# Patient Record
Sex: MALE | Race: WHITE | NOT HISPANIC OR LATINO | Employment: FULL TIME | URBAN - NONMETROPOLITAN AREA
[De-identification: names, ages, dates, MRNs, and addresses within clinical notes are randomized per-mention and may not be internally consistent; named-entity substitution may affect disease eponyms.]

---

## 2018-05-05 ENCOUNTER — OFFICE VISIT CONVERTED (OUTPATIENT)
Dept: FAMILY MEDICINE CLINIC | Age: 28
End: 2018-05-05
Attending: FAMILY MEDICINE

## 2018-12-08 ENCOUNTER — OFFICE VISIT CONVERTED (OUTPATIENT)
Dept: FAMILY MEDICINE CLINIC | Age: 28
End: 2018-12-08
Attending: FAMILY MEDICINE

## 2019-07-13 ENCOUNTER — HOSPITAL ENCOUNTER (OUTPATIENT)
Dept: OTHER | Facility: HOSPITAL | Age: 29
Discharge: HOME | End: 2019-07-13
Attending: FAMILY MEDICINE

## 2019-07-13 ENCOUNTER — OFFICE VISIT CONVERTED (OUTPATIENT)
Dept: FAMILY MEDICINE CLINIC | Age: 29
End: 2019-07-13
Attending: FAMILY MEDICINE

## 2019-07-13 LAB
ALBUMIN SERPL-MCNC: 4.5 G/DL (ref 3.5–5)
ALBUMIN/GLOB SERPL: 1.7 {RATIO} (ref 1.4–2.6)
ALP SERPL-CCNC: 95 U/L (ref 53–128)
ALT SERPL-CCNC: 159 U/L (ref 10–40)
ANION GAP SERPL CALC-SCNC: 18 MMOL/L (ref 8–19)
AST SERPL-CCNC: 61 U/L (ref 15–50)
BASOPHILS # BLD MANUAL: 0.03 10*3/UL (ref 0–0.2)
BASOPHILS NFR BLD MANUAL: 0.6 % (ref 0–3)
BILIRUB SERPL-MCNC: 0.51 MG/DL (ref 0.2–1.3)
BUN SERPL-MCNC: 11 MG/DL (ref 5–25)
BUN/CREAT SERPL: 15 {RATIO} (ref 6–20)
CALCIUM SERPL-MCNC: 9.4 MG/DL (ref 8.7–10.4)
CHLORIDE SERPL-SCNC: 106 MMOL/L (ref 99–111)
CHOLEST SERPL-MCNC: 160 MG/DL (ref 107–200)
CHOLEST/HDLC SERPL: 5.2 {RATIO} (ref 3–6)
CONV CO2: 23 MMOL/L (ref 22–32)
CONV TOTAL PROTEIN: 7.1 G/DL (ref 6.3–8.2)
CREAT UR-MCNC: 0.71 MG/DL (ref 0.7–1.2)
DEPRECATED RDW RBC AUTO: 39.6 FL
EOSINOPHIL # BLD MANUAL: 0.11 10*3/UL (ref 0–0.7)
EOSINOPHIL NFR BLD MANUAL: 2.2 % (ref 0–7)
ERYTHROCYTE [DISTWIDTH] IN BLOOD BY AUTOMATED COUNT: 13.5 % (ref 11.5–14.5)
GFR SERPLBLD BASED ON 1.73 SQ M-ARVRAT: >60 ML/MIN/{1.73_M2}
GLOBULIN UR ELPH-MCNC: 2.6 G/DL (ref 2–3.5)
GLUCOSE SERPL-MCNC: 137 MG/DL (ref 70–99)
GRANS (ABSOLUTE): 2.79 10*3/UL (ref 2–8)
GRANS: 55.4 % (ref 30–85)
HBA1C MFR BLD: 14.1 G/DL (ref 14–18)
HCT VFR BLD AUTO: 39.1 % (ref 42–52)
HDLC SERPL-MCNC: 31 MG/DL (ref 40–60)
IMM GRANULOCYTES # BLD: 0.03 10*3/UL (ref 0–0.54)
IMM GRANULOCYTES NFR BLD: 0.6 % (ref 0–0.43)
LDLC SERPL CALC-MCNC: 109 MG/DL (ref 70–100)
LYMPHOCYTES # BLD MANUAL: 1.64 10*3/UL (ref 1–5)
LYMPHOCYTES NFR BLD MANUAL: 8.7 % (ref 3–10)
MCH RBC QN AUTO: 29.1 PG (ref 27–31)
MCHC RBC AUTO-ENTMCNC: 36.1 G/DL (ref 33–37)
MCV RBC AUTO: 80.8 FL (ref 80–96)
MONOCYTES # BLD AUTO: 0.44 10*3/UL (ref 0.2–1.2)
OSMOLALITY SERPL CALC.SUM OF ELEC: 298 MOSM/KG (ref 273–304)
PLATELET # BLD AUTO: 157 10*3/UL (ref 130–400)
PMV BLD AUTO: 11.1 FL (ref 7.4–10.4)
POTASSIUM SERPL-SCNC: 4.3 MMOL/L (ref 3.5–5.3)
RBC # BLD AUTO: 4.84 10*6/UL (ref 4.7–6.1)
SODIUM SERPL-SCNC: 143 MMOL/L (ref 135–147)
TRIGL SERPL-MCNC: 98 MG/DL (ref 40–150)
TSH SERPL-ACNC: 2.51 M[IU]/L (ref 0.27–4.2)
VARIANT LYMPHS NFR BLD MANUAL: 32.5 % (ref 20–45)
VLDLC SERPL-MCNC: 20 MG/DL (ref 5–37)
WBC # BLD AUTO: 5.04 10*3/UL (ref 4.8–10.8)

## 2019-12-23 ENCOUNTER — HOSPITAL ENCOUNTER (OUTPATIENT)
Dept: URGENT CARE | Facility: CLINIC | Age: 29
Discharge: HOME | End: 2019-12-23
Attending: FAMILY MEDICINE

## 2020-02-24 ENCOUNTER — HOSPITAL ENCOUNTER (OUTPATIENT)
Dept: URGENT CARE | Facility: CLINIC | Age: 30
Discharge: HOME | End: 2020-02-24

## 2020-04-11 ENCOUNTER — HOSPITAL ENCOUNTER (OUTPATIENT)
Dept: OTHER | Facility: HOSPITAL | Age: 30
Discharge: HOME | End: 2020-04-11

## 2020-04-11 LAB
ALBUMIN SERPL-MCNC: 4.3 G/DL (ref 3.5–5)
ALBUMIN/GLOB SERPL: 1.9 {RATIO} (ref 1.4–2.6)
ALP SERPL-CCNC: 92 U/L (ref 53–128)
ALT SERPL-CCNC: 51 U/L (ref 10–40)
ANION GAP SERPL CALC-SCNC: 14 MMOL/L (ref 8–19)
AST SERPL-CCNC: 25 U/L (ref 15–50)
BASOPHILS # BLD MANUAL: 0.04 10*3/UL (ref 0–0.2)
BASOPHILS NFR BLD MANUAL: 0.8 % (ref 0–3)
BILIRUB SERPL-MCNC: 0.44 MG/DL (ref 0.2–1.3)
BUN SERPL-MCNC: 15 MG/DL (ref 5–25)
BUN/CREAT SERPL: 22 {RATIO} (ref 6–20)
CALCIUM SERPL-MCNC: 9.1 MG/DL (ref 8.7–10.4)
CHLORIDE SERPL-SCNC: 109 MMOL/L (ref 99–111)
CONV CO2: 21 MMOL/L (ref 22–32)
CONV TOTAL PROTEIN: 6.6 G/DL (ref 6.3–8.2)
CREAT UR-MCNC: 0.69 MG/DL (ref 0.7–1.2)
DEPRECATED RDW RBC AUTO: 41.2 FL
EOSINOPHIL # BLD MANUAL: 0.1 10*3/UL (ref 0–0.7)
EOSINOPHIL NFR BLD MANUAL: 2.1 % (ref 0–7)
ERYTHROCYTE [DISTWIDTH] IN BLOOD BY AUTOMATED COUNT: 13.5 % (ref 11.5–14.5)
GFR SERPLBLD BASED ON 1.73 SQ M-ARVRAT: >60 ML/MIN/{1.73_M2}
GLOBULIN UR ELPH-MCNC: 2.3 G/DL (ref 2–3.5)
GLUCOSE SERPL-MCNC: 104 MG/DL (ref 70–99)
GRANS (ABSOLUTE): 2.73 10*3/UL (ref 2–8)
GRANS: 56.4 % (ref 30–85)
HBA1C MFR BLD: 14.5 G/DL (ref 14–18)
HCT VFR BLD AUTO: 41.8 % (ref 42–52)
IMM GRANULOCYTES # BLD: 0.01 10*3/UL (ref 0–0.54)
IMM GRANULOCYTES NFR BLD: 0.2 % (ref 0–0.43)
INR PPP: 1.07 (ref 2–3)
LYMPHOCYTES # BLD MANUAL: 1.56 10*3/UL (ref 1–5)
LYMPHOCYTES NFR BLD MANUAL: 8.3 % (ref 3–10)
MCH RBC QN AUTO: 28.7 PG (ref 27–31)
MCHC RBC AUTO-ENTMCNC: 34.7 G/DL (ref 33–37)
MCV RBC AUTO: 82.8 FL (ref 80–96)
MONOCYTES # BLD AUTO: 0.4 10*3/UL (ref 0.2–1.2)
OSMOLALITY SERPL CALC.SUM OF ELEC: 291 MOSM/KG (ref 273–304)
PLATELET # BLD AUTO: 164 10*3/UL (ref 130–400)
PMV BLD AUTO: 11 FL (ref 7.4–10.4)
POTASSIUM SERPL-SCNC: 4.4 MMOL/L (ref 3.5–5.3)
PROTHROMBIN TIME: 11.4 S (ref 9.4–12)
RBC # BLD AUTO: 5.05 10*6/UL (ref 4.7–6.1)
SODIUM SERPL-SCNC: 140 MMOL/L (ref 135–147)
VARIANT LYMPHS NFR BLD MANUAL: 32.2 % (ref 20–45)
WBC # BLD AUTO: 4.84 10*3/UL (ref 4.8–10.8)

## 2020-04-13 ENCOUNTER — OFFICE VISIT CONVERTED (OUTPATIENT)
Dept: FAMILY MEDICINE CLINIC | Age: 30
End: 2020-04-13
Attending: FAMILY MEDICINE

## 2021-05-18 NOTE — PROGRESS NOTES
Barney Richmond 1990     Office/Outpatient Visit    Visit Date: Sat, May 5, 2018 08:25 am    Provider: Pipo Breman MD (Assistant: Ursula Medina RN)    Location: [facility]        Electronically signed by Pipo Berman MD on  05/05/2018 12:14:15 PM                             SUBJECTIVE:        CC:     Mr. Richmond is a 27 year old White male.  6 month check up         HPI:         Patient presents with hTN.  His current cardiac medication regimen includes an ACE inhibitor.  He has not kept a blood pressure diary, but states that pressures have been well controlled.  Compliance with treatment has been good.          Dx with high cholesterol; current treatment includes diet.  Compliance with treatment has been fair.  Most recent lab tests include total cholesterol level ( 168 mg/dl ).      ROS:     CONSTITUTIONAL:  Negative for chills and fever.      E/N/T:  Negative for ear pain, nasal congestion and sore throat.      CARDIOVASCULAR:  Negative for chest pain, palpitations and pedal edema.      RESPIRATORY:  Positive for SNORES, POS SLEEP STUDY, DECLINES CPAP AT THIS TIME..   Negative for recent cough or dyspnea.      GASTROINTESTINAL:  Negative for abdominal pain, nausea and vomiting.      NEUROLOGICAL:  Negative for headaches.          [facility]/SH:     Last Reviewed on 5/05/2018 08:36 AM by Pipo Berman    Past Medical History:             PAST MEDICAL HISTORY             CURRENT MEDICAL PROVIDERS:    Gastroenterologist         Surgical History:         Positive for    Pressure Equalization Tubes;     Positive for    Fracture(s):    humerus fracture: dx'd in LEFT; ; ;         Family History:         Positive for Coronary Artery Disease and Hypertension.      Positive for Colon Cancer.      Positive for Seizure Disorder.      Positive for Type 1 Diabetes.      Mother: Hypertension         Social History:     Occupation: OFFICE WORK;     Marital Status:           Tobacco/Alcohol/Supplements:     Last Reviewed on 5/05/2018 08:35 AM by Pipo Berman    Tobacco: He has never smoked.  Non-drinker     Caffeine:  He admits to consuming caffeine via -LITTLE.          Substance Abuse History:     Last Reviewed on 5/05/2018 08:35 AM by Pipo Berman    NEGATIVE             Current Problems:     Last Reviewed on 5/05/2018 08:36 AM by Pipo Berman    High cholesterol     Obstructive sleep apnea (declines CPAP)     HTN     Nonalcoholic fatty liver         Immunizations:     Influenza Virus Vaccine pf (adult) 10/5/2016     Influenza Virus Vaccine pf (adult) 10/9/2017         Allergies:     Last Reviewed on 5/05/2018 08:35 AM by Pipo Berman      No Known Drug Allergies.         Current Medications:     Last Reviewed on 5/05/2018 08:36 AM by Pipo Berman    Lisinopril 10mg Tablet 1 in am     Lipitor 10mg Tablet 1 tab daily     double blind clinical trial Obeticholic acid (OCA) 1 x day     Fish Oil 1,200mg Softgel capsule Take 1 capsule(s) by mouth daily     Vitamin E 400IU Capsules Take 2 capsule(s) by mouth daily         OBJECTIVE:        Vitals:         Current: 5/5/2018 8:29:35 AM    Ht:  5 ft, 9 in;  Wt: 290 lbs;  BMI: 42.8    T: 98.6 F (oral);  BP: 117/71 mm Hg (left arm, sitting);  P: 56 bpm (left arm (BP Cuff), sitting);  sCr: 0.88 mg/dL;  GFR: 148.09        Exams:     PHYSICAL EXAM:     GENERAL: Vitals recorded well developed, well nourished;  well groomed;  no apparent distress;     NECK: trachea is midline; thyroid is non-palpable;     RESPIRATORY: normal respiratory rate and pattern with no distress; normal breath sounds with no rales, rhonchi, wheezes or rubs;     CARDIOVASCULAR: normal rate; rhythm is regular;  no systolic murmur; no edema;     LYMPHATIC: no enlargement of cervical or facial nodes; no inguinal adenopathy;     NEUROLOGIC: GROSSLY INTACT     PSYCHIATRIC:  appropriate affect and demeanor; normal speech  pattern; grossly normal memory;         ASSESSMENT           401.1   I10  HTN              DDx:     272.0   E78.00  High cholesterol              DDx:         ORDERS:         Meds Prescribed:       Refill of: Lipitor (Atorvastatin Calcium) 10mg Tablet 1 tab daily  #90 (Ninety) tablet(s) Refills: 1       Refill of: Lisinopril 10mg Tablet 1 in am  #90 (Ninety) tablet(s) Refills: 1                 PLAN:          HTN         MEDICATIONS: (no change to current medication regimen)     RECOMMENDATIONS given include: perform routine monitoring of blood pressure with home blood pressure cuff.      FOLLOW-UP: Schedule a follow-up visit in 6 months.            Prescriptions:       Refill of: Lisinopril 10mg Tablet 1 in am  #90 (Ninety) tablet(s) Refills: 1          High cholesterol           Prescriptions:       Refill of: Lipitor (Atorvastatin Calcium) 10mg Tablet 1 tab daily  #90 (Ninety) tablet(s) Refills: 1             Patient Recommendations:        For  HTN:     Begin monitoring your blood pressure by brief nurse visits at our office, a home blood pressure monitor, or by checking on the machines in pharmacies or stores.  Keep a log of the readings.  Schedule a follow-up visit in 6 months.              CHARGE CAPTURE           **Please note: ICD descriptions below are intended for billing purposes only and may not represent clinical diagnoses**        Primary Diagnosis:         401.1 HTN            I10    Essential (primary) hypertension              Orders:          71152   Office/outpatient visit; established patient, level 4  (In-House)           272.0 High cholesterol            E78.00    Pure hypercholesterolemia, unspecified

## 2021-05-18 NOTE — PROGRESS NOTES
Barney Richmond  1990     Office/Outpatient Visit    Visit Date: Mon, Apr 13, 2020 09:49 am    Provider: Patel Berman MD (Assistant: Marva Mcginnis MA)    Location: [facility]        Electronically signed by Patel Berman MD on  04/13/2020 10:36:56 AM                             Subjective:        CC: Mr. Richmond is a 29 year old White male.  This is a follow-up visit.  med refills PRESENT:  PATEL KEVIN MD         HPI:           Complaint of nonalcoholic fatty liver..  The symptom began years ago.  The severity is of moderate intensity.  FOLLOWED BY GASTRO, TAKEING AN INVESTIGATIONAL MED           Concerning essential (primary) hypertension, this was first diagnosed several years ago.  His current cardiac medication regimen includes an ACE inhibitor.  Compliance with treatment has been good.  Mr. Richmond does not check his blood pressure other than at his clinic appointments.            In regard to the pure hypercholesterolemia, unspecified, current treatment includes a lipid lowering agent.  Compliance with treatment has been good.  Most recent lab tests include Total Cholesterol:  160 (mg/dL) (07/13/2019), HDL:  31 (mg/dL) (07/13/2019), Triglycerides:  98 (mg/dL) (07/13/2019), LDL:  109 (mg/dL) (07/13/2019).  HE IS ALSO ON A RESEARCH MED FOR HIS NELSON     ROS:     CONSTITUTIONAL:  Negative for chills and fever.      E/N/T:  Negative for ear pain, nasal congestion and sore throat.      CARDIOVASCULAR:  Negative for chest pain, palpitations and pedal edema.      RESPIRATORY:  Positive for POS SLEEP STUDY, TOLERATING CPAP WELL AND FEELING BETTER.   Negative for recent cough or dyspnea.      GASTROINTESTINAL:  Negative for abdominal pain, nausea and vomiting.      MUSCULOSKELETAL:  Negative for myalgias.      NEUROLOGICAL:  Negative for headaches.          Past Medical History / Family History / Social History:         Last Reviewed on 4/13/2020 10:09  AM by Pipo Berman    Past Medical History:             PAST MEDICAL HISTORY             CURRENT MEDICAL PROVIDERS:    Gastroenterologist         Surgical History:         Positive for    Pressure Equalization Tubes;     Positive for    Fracture(s):    humerus fracture: internal fixation, LEFT-PLATED;;; ;         Family History:         Positive for Coronary Artery Disease and Hypertension.      Positive for Colon Cancer.      Positive for Cerebrovascular Accident and Seizure Disorder.      Positive for Type 1 Diabetes.      Mother: Hypertension         Social History:     Occupation: OFFICE WORK;     Marital Status:          Tobacco/Alcohol/Supplements:     Last Reviewed on 4/13/2020 10:09 AM by Pipo Berman    Tobacco: He has never smoked.  Non-drinker     Caffeine:  He admits to consuming caffeine via -LITTLE.          Substance Abuse History:     Last Reviewed on 4/13/2020 10:09 AM by Pipo Berman    NEGATIVE         Current Problems:     Last Reviewed on 4/13/2020 10:10 AM by Pipo Berman    Fatty (change of) liver, not elsewhere classified    Nonalcoholic fatty liver    Essential (primary) hypertension    Obstructive sleep apnea (CPAP)    Pure hypercholesterolemia, unspecified        Immunizations:     Fluzone Quadrivalent (3+ years) 12/8/2018    Influenza Virus Vaccine pf (adult) 10/5/2016    Influenza Virus Vaccine pf (adult) 10/9/2017        Allergies:     Last Reviewed on 4/13/2020 10:10 AM by Pipo Berman    No Known Allergies.        Current Medications:     Last Reviewed on 4/13/2020 10:10 AM by Pipo Berman    lisinopriL 10 mg oral tablet [TAKE 1 TABLET BY MOUTH IN THE MORNING]    Fish Oil 360-1,200 mg oral capsule [Take 1 capsule(s) by mouth daily]    Vitamin E 400 unit oral capsule [Take 2 capsule(s) by mouth daily]    double blind clinical trial Obeticholic acid (OCA) 1 x day     atorvastatin 10 mg oral tablet [TAKE 1 TABLET BY  MOUTH EVERY DAY]        Assessment:         K76.0   Fatty (change of) liver, not elsewhere classified       I10   Essential (primary) hypertension       E78.00   Pure hypercholesterolemia, unspecified       G47.33   Obstructive sleep apnea (CPAP)           ORDERS:         Meds Prescribed:       [Refilled] lisinopriL 10 mg oral tablet [TAKE 1 TABLET BY MOUTH IN THE MORNING], #90 (ninety) tablets, Refills: 1 (one)       [Refilled] atorvastatin 10 mg oral tablet [TAKE 1 TABLET BY MOUTH EVERY DAY], #90 (ninety) tablets, Refills: 1 (one)         Lab Orders:       APPTO  Appointment need  (In-House)                      Plan:         Fatty (change of) liver, not elsewhere classified    Telehealth: Verbal consent obtained for visit to occur via phone call; Total time spent was 6 minutes; 99221--[place]ho[place] E/M 5-10 minutes     FOLLOW-UP: Schedule a follow-up visit in 6 months.:.  PLANS PER SPECIALIST           Orders:       APPTO  Appointment need  (In-House)              Essential (primary) hypertension          Prescriptions:       [Refilled] lisinopriL 10 mg oral tablet [TAKE 1 TABLET BY MOUTH IN THE MORNING], #90 (ninety) tablets, Refills: 1 (one)         Pure hypercholesterolemia, unspecified          Prescriptions:       [Refilled] atorvastatin 10 mg oral tablet [TAKE 1 TABLET BY MOUTH EVERY DAY], #90 (ninety) tablets, Refills: 1 (one)         Obstructive sleep apnea (CPAP)    Observe as improved             Patient Recommendations:        For  Fatty (change of) liver, not elsewhere classified:    Schedule a follow-up visit in 6 months.                APPOINTMENT INFORMATION:        Monday Tuesday Wednesday Thursday Friday Saturday Sunday            Time:___________________AM  PM   Date:_____________________             Charge Capture:         Primary Diagnosis:     K76.0  Fatty (change of) liver, not elsewhere classified           Orders:      29691  Phys/QHP telephone evaluation 5-10 min  (In-House)             APPTO  Appointment need  (In-House)              I10  Essential (primary) hypertension     E78.00  Pure hypercholesterolemia, unspecified     G47.33  Obstructive sleep apnea (CPAP)

## 2021-05-18 NOTE — PROGRESS NOTES
Barney Richmond 1990     Office/Outpatient Visit    Visit Date: Sat, Jul 13, 2019 08:28 am    Provider: Pipo Berman MD (Assistant: Ursula Medina RN)    Location: [facility]        Electronically signed by Pipo Berman MD on  07/13/2019 12:29:41 PM                             SUBJECTIVE:        CC:     Mr. Richmond is a 28 year old White male.  6 month follow up         HPI:         Mr. Richmond presents with screening for depression.          With regard to the hTN, his current cardiac medication regimen includes an ACE inhibitor.  He has not kept a blood pressure diary, but states that pressures have been well controlled.  Compliance with treatment has been good.          High cholesterol details; current treatment includes a lipid lowering agent.  Compliance with treatment has been good.  Most recent lab tests include total cholesterol level ( 141 mg/dl ).  HE IS ALSO ON A RESEARCH MED FOR HIS NELSON         PHQ-9 Depression Screening: Completed form scanned and in chart; Total Score 4 Alcohol Consumption Screening: Completed form scanned and in chart; Total Score 0     ROS:     CONSTITUTIONAL:  Negative for chills and fever.      E/N/T:  Negative for ear pain, nasal congestion and sore throat.      CARDIOVASCULAR:  Negative for chest pain, palpitations and pedal edema.      RESPIRATORY:  Positive for SNORES, POS SLEEP STUDY, HE WOULD NOW LIKE TO START CPAP..   Negative for recent cough or dyspnea.      GASTROINTESTINAL:  Negative for abdominal pain, nausea and vomiting.      NEUROLOGICAL:  Negative for headaches.          PMH/FMH/SH:     Last Reviewed on 7/13/2019 08:49 AM by Pipo Berman    Past Medical History:             PAST MEDICAL HISTORY             CURRENT MEDICAL PROVIDERS:    Gastroenterologist         Surgical History:         Positive for    Pressure Equalization Tubes;     Positive for    Fracture(s):    humerus fracture: dx'd in LEFT; ; ;         Family  History:         Positive for Coronary Artery Disease and Hypertension.      Positive for Colon Cancer.      Positive for Cerebrovascular Accident and Seizure Disorder.      Positive for Type 1 Diabetes.      Mother: Hypertension         Social History:     Occupation: OFFICE WORK;     Marital Status:          Tobacco/Alcohol/Supplements:     Last Reviewed on 7/13/2019 08:48 AM by Pipo Berman    Tobacco: He has never smoked.  Non-drinker     Caffeine:  He admits to consuming caffeine via -LITTLE.          Substance Abuse History:     Last Reviewed on 7/13/2019 08:48 AM by Pipo Berman    NEGATIVE             Current Problems:     Last Reviewed on 7/13/2019 08:50 AM by Pipo Berman    High cholesterol     Obstructive sleep apnea (declines CPAP)     HTN     Nonalcoholic fatty liver     Screening for depression         Immunizations:     Fluzone Quadrivalent (3+ years) 12/8/2018     Influenza Virus Vaccine pf (adult) 10/5/2016     Influenza Virus Vaccine pf (adult) 10/9/2017         Allergies:     Last Reviewed on 7/13/2019 08:48 AM by Pipo Berman      No Known Drug Allergies.         Current Medications:     Last Reviewed on 7/13/2019 08:49 AM by Pipo Berman    Lipitor 10mg Tablet 1 tab daily     Lisinopril 10mg Tablet 1 in am     double blind clinical trial Obeticholic acid (OCA) 1 x day     Fish Oil 1,200mg Softgel capsule Take 1 capsule(s) by mouth daily     Vitamin E 400IU Capsules Take 2 capsule(s) by mouth daily         OBJECTIVE:        Vitals:         Current: 7/13/2019 8:36:35 AM    Ht:  5 ft, 9 in;  Wt: 333 lbs;  BMI: 49.2    T: 98.2 F (oral);  BP: 143/77 mm Hg (right arm, sitting);  P: 93 bpm (right arm (BP Cuff), sitting);  sCr: 0.88 mg/dL;  GFR: 155.71        Exams:     PHYSICAL EXAM:     GENERAL: Vitals recorded well developed, well nourished;  well groomed;  no apparent distress;     NECK: trachea is midline; thyroid is non-palpable;      RESPIRATORY: normal respiratory rate and pattern with no distress; normal breath sounds with no rales, rhonchi, wheezes or rubs;     CARDIOVASCULAR: normal rate; rhythm is regular;  no systolic murmur; no edema;     LYMPHATIC: no enlargement of cervical or facial nodes; no inguinal adenopathy;     NEUROLOGIC: GROSSLY INTACT     PSYCHIATRIC:  appropriate affect and demeanor; normal speech pattern; grossly normal memory;         ASSESSMENT           401.1   I10  HTN              DDx:     272.0   E78.00  High cholesterol              DDx:     327.23   G47.33  Obstructive sleep apnea              DDx:     V79.0   Z13.31  Screening for depression              DDx:         ORDERS:         Lab Orders:       [id number]  [facility] Comp. Metabolic Panel  (Send-Out)         26401  [facility] - [facility] CBC with 3 part diff  (Send-Out)         53479  TSH - [facility] TSH  (Send-Out)         41860  [facility] Lipid Panel  (Send-Out)           Procedures Ordered:       REFER  Referral to Specialist or Other Facility  (Send-Out)           Other Orders:         Depression screen negative  (In-House)           Negative EtOH screen  (In-House)                   PLAN:          HTN     LABORATORY:  Labs ordered to be performed today include CBC, Comprehensive metabolic panel, and TSH.  MIPS Negative Depression Screen Negative alcohol screen     MEDICATIONS: (no change to current medication regimen)     RECOMMENDATIONS given include: perform routine monitoring of blood pressure with home blood pressure cuff.      FOLLOW-UP: Schedule a follow-up visit in 6 months.            Orders:       [id number]  [facility] Comp. Metabolic Panel  (Send-Out)         30868  [facility] - [facility] CBC with 3 part diff  (Send-Out)         91044  TSH - [facility] TSH  (Send-Out)           Depression screen negative  (In-House)           Negative EtOH screen  (In-House)            High cholesterol     LABORATORY:  Labs ordered to be performed today include lipid panel.             Orders:       71171  [facility] Lipid Panel  (Send-Out)            Obstructive sleep apnea         REFERRALS:  Referral initiated to [facility].            Orders:       REFER  Referral to Specialist or Other Facility  (Send-Out)               Patient Recommendations:        For  HTN:     Begin monitoring your blood pressure by brief nurse visits at our office, a home blood pressure monitor, or by checking on the machines in pharmacies or stores.  Keep a log of the readings.  Schedule a follow-up visit in 6 months.              CHARGE CAPTURE           **Please note: ICD descriptions below are intended for billing purposes only and may not represent clinical diagnoses**        Primary Diagnosis:         401.1 HTN            I10    Essential (primary) hypertension              Orders:          [id number]   Office/outpatient visit; established patient, level 4  (In-House)                Depression screen negative  (In-House)                Negative EtOH screen  (In-House)           272.0 High cholesterol            E78.00    Pure hypercholesterolemia, unspecified    327.23 Obstructive sleep apnea            G47.33    Obstructive sleep apnea (adult) (pediatric)    V79.0 Screening for depression            Z13.31    Encounter for screening for depression

## 2021-05-18 NOTE — PROGRESS NOTES
Barney Richmond 1990     Office/Outpatient Visit    Visit Date: Sat, Dec 8, 2018 08:33 am    Provider: Pipo Berman MD (Assistant: Chasidy Mancilla MA)    Location: [facility]        Electronically signed by Pipo Berman MD on  12/08/2018 11:13:40 AM                             SUBJECTIVE:        CC:     Mr. Richmond is a 28 year old White male.  This is a follow-up visit.  Patient presents today for 6 month follow up         HPI:         Patient to be evaluated for hTN.  His current cardiac medication regimen includes an ACE inhibitor.  He has not kept a blood pressure diary, but states that pressures have been well controlled.  Compliance with treatment has been good.          With regard to the high cholesterol, current treatment includes a lipid lowering agent.  Compliance with treatment has been good.  Most recent lab tests include total cholesterol level ( 141 mg/dl ).  HE IS ALSO ON A RESEARCH MED FOR HIS NELSON     ROS:     CONSTITUTIONAL:  Negative for chills and fever.      E/N/T:  Negative for ear pain, nasal congestion and sore throat.      CARDIOVASCULAR:  Negative for chest pain, palpitations and pedal edema.      RESPIRATORY:  Positive for SNORES, POS SLEEP STUDY, DECLINES CPAP AT THIS TIME..   Negative for recent cough or dyspnea.      GASTROINTESTINAL:  Negative for abdominal pain, nausea and vomiting.      NEUROLOGICAL:  Negative for headaches.          PMH/FMH/SH:     Last Reviewed on 12/08/2018 08:50 AM by Pipo Berman    Past Medical History:             PAST MEDICAL HISTORY             CURRENT MEDICAL PROVIDERS:    Gastroenterologist         Surgical History:         Positive for    Pressure Equalization Tubes;     Positive for    Fracture(s):    humerus fracture: dx'd in LEFT; ; ;         Family History:         Positive for Coronary Artery Disease and Hypertension.      Positive for Colon Cancer.      Positive for Seizure Disorder.      Positive for Type 1  Diabetes.      Mother: Hypertension         Social History:     Occupation: OFFICE WORK;     Marital Status:          Tobacco/Alcohol/Supplements:     Last Reviewed on 12/08/2018 08:49 AM by Pipo Berman    Tobacco: He has never smoked.  Non-drinker     Caffeine:  He admits to consuming caffeine via -LITTLE.          Substance Abuse History:     Last Reviewed on 12/08/2018 08:49 AM by Pipo Berman    NEGATIVE             Current Problems:     Last Reviewed on 12/08/2018 08:51 AM by Pipo Berman    High cholesterol     Obstructive sleep apnea (declines CPAP)     HTN     Nonalcoholic fatty liver         Immunizations:     Fluzone Quadrivalent (3+ years) 12/8/2018     Influenza Virus Vaccine pf (adult) 10/5/2016     Influenza Virus Vaccine pf (adult) 10/9/2017         Allergies:     Last Reviewed on 12/08/2018 08:49 AM by Pipo Berman      No Known Drug Allergies.         Current Medications:     Last Reviewed on 5/05/2018 08:36 AM by Pipo Berman    Lipitor 10mg Tablet 1 tab daily     Lisinopril 10mg Tablet 1 in am     double blind clinical trial Obeticholic acid (OCA) 1 x day     Fish Oil 1,200mg Softgel capsule Take 1 capsule(s) by mouth daily     Vitamin E 400IU Capsules Take 2 capsule(s) by mouth daily         OBJECTIVE:        Vitals:         Current: 12/8/2018 8:37:59 AM    Ht:  5 ft, 9 in;  Wt: 318.6 lbs;  BMI: 47.0    T: 97.8 F (oral);  BP: 139/73 mm Hg (left arm, sitting);  P: 77 bpm (left arm (BP Cuff), sitting);  sCr: 0.88 mg/dL;  GFR: 152.81        Exams:     PHYSICAL EXAM:     GENERAL: Vitals recorded well developed, well nourished;  well groomed;  no apparent distress;     NECK: trachea is midline; thyroid is non-palpable;     RESPIRATORY: normal respiratory rate and pattern with no distress; normal breath sounds with no rales, rhonchi, wheezes or rubs;     CARDIOVASCULAR: normal rate; rhythm is regular;  no systolic murmur; no edema;      LYMPHATIC: no enlargement of cervical or facial nodes; no inguinal adenopathy;     NEUROLOGIC: GROSSLY INTACT     PSYCHIATRIC:  appropriate affect and demeanor; normal speech pattern; grossly normal memory;         ASSESSMENT           401.1   I10  HTN              DDx:     272.0   E78.00  High cholesterol              DDx:         ORDERS:         Meds Prescribed:       Refill of: Lipitor (Atorvastatin Calcium) 10mg Tablet 1 tab daily  #90 (Ninety) tablet(s) Refills: 1       Refill of: Lisinopril 10mg Tablet 1 in am  #90 (Ninety) tablet(s) Refills: 1                 PLAN:          HTN         MEDICATIONS: (no change to current medication regimen)     RECOMMENDATIONS given include: perform routine monitoring of blood pressure with home blood pressure cuff.      FOLLOW-UP: Schedule a follow-up visit in 6 months.      HE STILL DECLINES THE CPAP           Prescriptions:       Refill of: Lisinopril 10mg Tablet 1 in am  #90 (Ninety) tablet(s) Refills: 1          High cholesterol         WE WILL REQUEST LAB REPORTS FROM HIS SPECIALIST           Prescriptions:       Refill of: Lipitor (Atorvastatin Calcium) 10mg Tablet 1 tab daily  #90 (Ninety) tablet(s) Refills: 1             Patient Recommendations:        For  HTN:     Begin monitoring your blood pressure by brief nurse visits at our office, a home blood pressure monitor, or by checking on the machines in pharmacies or stores.  Keep a log of the readings.  Schedule a follow-up visit in 6 months.          For  High cholesterol:     I also recommend WE WILL REQUEST LAB REPORTS FROM HIS SPECIALIST.              CHARGE CAPTURE           **Please note: ICD descriptions below are intended for billing purposes only and may not represent clinical diagnoses**        Primary Diagnosis:         401.1 HTN            I10    Essential (primary) hypertension              Orders:          14495   Office/outpatient visit; established patient, level 4  (In-House)           272.0 High  cholesterol            E78.00    Pure hypercholesterolemia, unspecified

## 2021-07-01 VITALS
HEART RATE: 77 BPM | WEIGHT: 315 LBS | BODY MASS INDEX: 46.65 KG/M2 | HEIGHT: 69 IN | TEMPERATURE: 97.8 F | DIASTOLIC BLOOD PRESSURE: 73 MMHG | SYSTOLIC BLOOD PRESSURE: 139 MMHG

## 2021-07-01 VITALS
WEIGHT: 315 LBS | TEMPERATURE: 98.2 F | BODY MASS INDEX: 46.65 KG/M2 | HEIGHT: 69 IN | DIASTOLIC BLOOD PRESSURE: 77 MMHG | HEART RATE: 93 BPM | SYSTOLIC BLOOD PRESSURE: 143 MMHG

## 2021-07-01 VITALS
HEART RATE: 56 BPM | WEIGHT: 290 LBS | HEIGHT: 69 IN | TEMPERATURE: 98.6 F | SYSTOLIC BLOOD PRESSURE: 117 MMHG | BODY MASS INDEX: 42.95 KG/M2 | DIASTOLIC BLOOD PRESSURE: 71 MMHG

## 2021-07-06 RX ORDER — ATORVASTATIN CALCIUM 10 MG/1
TABLET, FILM COATED ORAL
Qty: 90 TABLET | Refills: 0 | Status: SHIPPED | OUTPATIENT
Start: 2021-07-06 | End: 2021-10-04

## 2021-09-12 PROBLEM — E78.00 HIGH CHOLESTEROL: Status: ACTIVE | Noted: 2021-09-12

## 2021-09-12 PROBLEM — G47.33 OBSTRUCTIVE SLEEP APNEA: Status: ACTIVE | Noted: 2021-09-12

## 2021-09-12 PROBLEM — K75.81 NASH (NONALCOHOLIC STEATOHEPATITIS): Status: ACTIVE | Noted: 2021-09-12

## 2021-09-12 PROBLEM — I10 ESSENTIAL HYPERTENSION: Status: ACTIVE | Noted: 2021-09-12

## 2021-11-01 ENCOUNTER — OFFICE VISIT (OUTPATIENT)
Dept: FAMILY MEDICINE CLINIC | Age: 31
End: 2021-11-01

## 2021-11-01 VITALS
TEMPERATURE: 98.1 F | HEIGHT: 69 IN | BODY MASS INDEX: 46.65 KG/M2 | WEIGHT: 315 LBS | DIASTOLIC BLOOD PRESSURE: 77 MMHG | SYSTOLIC BLOOD PRESSURE: 139 MMHG | HEART RATE: 72 BPM

## 2021-11-01 DIAGNOSIS — Z00.00 ANNUAL VISIT FOR GENERAL ADULT MEDICAL EXAMINATION WITHOUT ABNORMAL FINDINGS: Primary | ICD-10-CM

## 2021-11-01 PROCEDURE — 99395 PREV VISIT EST AGE 18-39: CPT | Performed by: FAMILY MEDICINE

## 2021-11-01 RX ORDER — ATORVASTATIN CALCIUM 40 MG/1
40 TABLET, FILM COATED ORAL DAILY
COMMUNITY
Start: 2021-10-14 | End: 2022-05-05 | Stop reason: SDUPTHER

## 2021-11-01 RX ORDER — LISINOPRIL 10 MG/1
10 TABLET ORAL DAILY
Qty: 90 TABLET | Refills: 3 | Status: SHIPPED | OUTPATIENT
Start: 2021-11-01 | End: 2022-05-05 | Stop reason: SDUPTHER

## 2021-11-01 NOTE — PROGRESS NOTES
"Chief Complaint  Annual Exam    Subjective          Barney Richmond presents to [facility]  --ANNUAL EXAM.  LAST EXAM ONE YEAR AGO.  DOES NOT SMOKE  --IS OUT OF BP MEDS BUT TOLERATED WELL  --IS FOLLOWED BY GASTRO FOR NELSON AND HIGH CHOL.  ON A STATIN AND A RESEARCH MED  --OCCASIONAL BRIEF SHARP RLQ PAIN.          No Known Allergies     Health Maintenance Due   Topic Date Due   • HEPATITIS C SCREENING  Never done   • LIPID PANEL  09/12/2021        Current Outpatient Medications on File Prior to Visit   Medication Sig   • atorvastatin (LIPITOR) 40 MG tablet Take 40 mg by mouth Daily.     No current facility-administered medications on file prior to visit.       Immunization History   Administered Date(s) Administered   • COVID-19 (MODERNA) 03/17/2021, 04/14/2021, 10/23/2021   • Flu Vaccine Quad PF >18YRS 10/23/2021   • Tdap 03/01/2015       Review of Systems   Constitutional: Negative for activity change, appetite change, chills, fatigue and fever.   HENT: Negative for congestion, ear pain, hearing loss, rhinorrhea and sore throat.    Eyes: Negative for blurred vision and discharge.   Respiratory: Negative for cough and shortness of breath.    Cardiovascular: Negative for chest pain, palpitations and leg swelling.   Gastrointestinal: Negative for constipation, diarrhea, nausea and vomiting.   Genitourinary: Negative for dysuria and hematuria.   Musculoskeletal: Negative for arthralgias and myalgias.   Neurological: Negative for headache.   Psychiatric/Behavioral: Negative for depressed mood.        Objective     /77 (BP Location: Left arm, Patient Position: Sitting)   Pulse 72   Temp 98.1 °F (36.7 °C) (Oral)   Ht 175.3 cm (69\")   Wt (!) 151 kg (333 lb 9.6 oz)   BMI 49.26 kg/m²       Physical Exam  Vitals and nursing note reviewed.   Constitutional:       General: He is not in acute distress.     Appearance: Normal appearance.   HENT:      Right Ear: Tympanic membrane " normal.      Left Ear: Tympanic membrane normal.      Mouth/Throat:      Pharynx: Oropharynx is clear.   Eyes:      Conjunctiva/sclera: Conjunctivae normal.   Cardiovascular:      Rate and Rhythm: Normal rate and regular rhythm.      Heart sounds: Normal heart sounds. No murmur heard.      Pulmonary:      Effort: Pulmonary effort is normal.      Breath sounds: Normal breath sounds.   Abdominal:      General: Bowel sounds are normal. There is no distension.      Palpations: Abdomen is soft. There is no mass.      Tenderness: There is no abdominal tenderness. There is no guarding or rebound.      Hernia: No hernia is present.   Genitourinary:     Testes: Normal.   Musculoskeletal:      Cervical back: Neck supple.      Right lower leg: No edema.      Left lower leg: No edema.   Lymphadenopathy:      Cervical: No cervical adenopathy.   Neurological:      General: No focal deficit present.      Mental Status: He is alert.      Cranial Nerves: No cranial nerve deficit.      Coordination: Coordination normal.      Gait: Gait normal.   Psychiatric:         Mood and Affect: Mood normal.         Behavior: Behavior normal.         Result Review :                             Assessment and Plan      Diagnoses and all orders for this visit:    1. Annual visit for general adult medical examination without abnormal findings (Primary)  Assessment & Plan:  ADVICE WAS GIVEN RE:  ALCOHOL USE, SEATBELT USE, REGULAR EXERCISE, HEALTHY DIET, ROUTINE EYE AND DENTAL EXAMS.  WILL RESTART THE BP MED AND OBSERVE THE OCCASIONAL RLQ PAIN.  OTHER PLANS PER HIS SPECIALIST.        Other orders  -     lisinopril (PRINIVIL,ZESTRIL) 10 MG tablet; Take 1 tablet by mouth Daily.  Dispense: 90 tablet; Refill: 3          Follow Up     Return in about 6 months (around 5/1/2022).    Patient was given instructions and counseling regarding his condition or for health maintenance advice. Please see specific information pulled into the AVS if appropriate.

## 2021-11-01 NOTE — ASSESSMENT & PLAN NOTE
ADVICE WAS GIVEN RE:  ALCOHOL USE, SEATBELT USE, REGULAR EXERCISE, HEALTHY DIET, ROUTINE EYE AND DENTAL EXAMS.  WILL RESTART THE BP MED AND OBSERVE THE OCCASIONAL RLQ PAIN.  OTHER PLANS PER HIS SPECIALIST.

## 2022-05-05 ENCOUNTER — LAB (OUTPATIENT)
Dept: LAB | Facility: HOSPITAL | Age: 32
End: 2022-05-05

## 2022-05-05 ENCOUNTER — OFFICE VISIT (OUTPATIENT)
Dept: FAMILY MEDICINE CLINIC | Age: 32
End: 2022-05-05

## 2022-05-05 VITALS
HEIGHT: 69 IN | SYSTOLIC BLOOD PRESSURE: 134 MMHG | HEART RATE: 81 BPM | DIASTOLIC BLOOD PRESSURE: 81 MMHG | BODY MASS INDEX: 46.65 KG/M2 | WEIGHT: 315 LBS

## 2022-05-05 DIAGNOSIS — I10 ESSENTIAL HYPERTENSION: Primary | ICD-10-CM

## 2022-05-05 DIAGNOSIS — K75.81 NONALCOHOLIC STEATOHEPATITIS (NASH): ICD-10-CM

## 2022-05-05 DIAGNOSIS — E78.00 HIGH CHOLESTEROL: ICD-10-CM

## 2022-05-05 DIAGNOSIS — G47.33 OBSTRUCTIVE SLEEP APNEA: ICD-10-CM

## 2022-05-05 DIAGNOSIS — R10.31 RLQ ABDOMINAL PAIN: ICD-10-CM

## 2022-05-05 PROBLEM — Z00.00 ANNUAL VISIT FOR GENERAL ADULT MEDICAL EXAMINATION WITHOUT ABNORMAL FINDINGS: Status: RESOLVED | Noted: 2021-11-01 | Resolved: 2022-05-05

## 2022-05-05 LAB
ALBUMIN SERPL-MCNC: 4.9 G/DL (ref 3.5–5.2)
ALBUMIN/GLOB SERPL: 2.1 G/DL
ALP SERPL-CCNC: 81 U/L (ref 39–117)
ALT SERPL W P-5'-P-CCNC: 55 U/L (ref 1–41)
ANION GAP SERPL CALCULATED.3IONS-SCNC: 15.4 MMOL/L (ref 5–15)
AST SERPL-CCNC: 34 U/L (ref 1–40)
BILIRUB SERPL-MCNC: 1.1 MG/DL (ref 0–1.2)
BUN SERPL-MCNC: 16 MG/DL (ref 6–20)
BUN/CREAT SERPL: 17.6 (ref 7–25)
CALCIUM SPEC-SCNC: 10.1 MG/DL (ref 8.6–10.5)
CHLORIDE SERPL-SCNC: 105 MMOL/L (ref 98–107)
CHOLEST SERPL-MCNC: 151 MG/DL (ref 0–200)
CO2 SERPL-SCNC: 19.6 MMOL/L (ref 22–29)
CREAT SERPL-MCNC: 0.91 MG/DL (ref 0.76–1.27)
DEPRECATED RDW RBC AUTO: 42.7 FL (ref 37–54)
EGFRCR SERPLBLD CKD-EPI 2021: 116.3 ML/MIN/1.73
ERYTHROCYTE [DISTWIDTH] IN BLOOD BY AUTOMATED COUNT: 13.4 % (ref 12.3–15.4)
GLOBULIN UR ELPH-MCNC: 2.3 GM/DL
GLUCOSE SERPL-MCNC: 83 MG/DL (ref 65–99)
HBA1C MFR BLD: 4.7 % (ref 4.8–5.6)
HCT VFR BLD AUTO: 41.4 % (ref 37.5–51)
HDLC SERPL-MCNC: 24 MG/DL (ref 40–60)
HGB BLD-MCNC: 13.8 G/DL (ref 13–17.7)
LDLC SERPL CALC-MCNC: 114 MG/DL (ref 0–100)
LDLC/HDLC SERPL: 4.74 {RATIO}
MCH RBC QN AUTO: 28.9 PG (ref 26.6–33)
MCHC RBC AUTO-ENTMCNC: 33.3 G/DL (ref 31.5–35.7)
MCV RBC AUTO: 86.6 FL (ref 79–97)
PLATELET # BLD AUTO: 168 10*3/MM3 (ref 140–450)
PMV BLD AUTO: 11.7 FL (ref 6–12)
POTASSIUM SERPL-SCNC: 4.8 MMOL/L (ref 3.5–5.2)
PROT SERPL-MCNC: 7.2 G/DL (ref 6–8.5)
RBC # BLD AUTO: 4.78 10*6/MM3 (ref 4.14–5.8)
SODIUM SERPL-SCNC: 140 MMOL/L (ref 136–145)
TRIGL SERPL-MCNC: 66 MG/DL (ref 0–150)
TSH SERPL DL<=0.05 MIU/L-ACNC: 2.27 UIU/ML (ref 0.27–4.2)
VLDLC SERPL-MCNC: 13 MG/DL (ref 5–40)
WBC NRBC COR # BLD: 4.14 10*3/MM3 (ref 3.4–10.8)

## 2022-05-05 PROCEDURE — 80061 LIPID PANEL: CPT | Performed by: FAMILY MEDICINE

## 2022-05-05 PROCEDURE — 80050 GENERAL HEALTH PANEL: CPT | Performed by: FAMILY MEDICINE

## 2022-05-05 PROCEDURE — 99214 OFFICE O/P EST MOD 30 MIN: CPT | Performed by: FAMILY MEDICINE

## 2022-05-05 PROCEDURE — 36415 COLL VENOUS BLD VENIPUNCTURE: CPT | Performed by: FAMILY MEDICINE

## 2022-05-05 PROCEDURE — 83036 HEMOGLOBIN GLYCOSYLATED A1C: CPT | Performed by: FAMILY MEDICINE

## 2022-05-05 RX ORDER — ATORVASTATIN CALCIUM 40 MG/1
40 TABLET, FILM COATED ORAL DAILY
Qty: 90 TABLET | Refills: 3 | Status: SHIPPED | OUTPATIENT
Start: 2022-05-05

## 2022-05-05 RX ORDER — LISINOPRIL 10 MG/1
10 TABLET ORAL DAILY
Qty: 90 TABLET | Refills: 3 | Status: SHIPPED | OUTPATIENT
Start: 2022-05-05

## 2022-05-05 NOTE — PROGRESS NOTES
Chief Complaint  Follow-up (6 month follow up for Hypertension. )    Subjective          Barney Richmond presents to [facility]  --TOLERATING BLOOD PRESSURE MEDICATION WITHOUT APPARENT SIDE EFFECTS, DOES NOT CHECK BP AT HOME  --TOLERATING STATIN BUT IS UNAWARE OF RECENT LAB RESULTS  --IS FOLLOWED BY GASTRO FOR NELSON AND CONTINUES ON A RESEARCH MED FOR THIS  --NOW TOLERATING CPAP WELL  --NO MORE RLQ PAINS        No Known Allergies     Health Maintenance Due   Topic Date Due   • HEPATITIS C SCREENING  Never done   • LIPID PANEL  09/12/2021        Current Outpatient Medications on File Prior to Visit   Medication Sig   • [DISCONTINUED] atorvastatin (LIPITOR) 40 MG tablet Take 40 mg by mouth Daily.   • [DISCONTINUED] lisinopril (PRINIVIL,ZESTRIL) 10 MG tablet Take 1 tablet by mouth Daily.     No current facility-administered medications on file prior to visit.       Immunization History   Administered Date(s) Administered   • COVID-19 (MODERNA) 1st, 2nd, 3rd Dose Only 03/17/2021, 04/14/2021, 10/23/2021   • Flu Vaccine Quad PF >36MO 10/23/2021   • Fluzone Split Quad (Multi-dose) 10/23/2021   • Hep B, Adolescent or Pediatric 08/02/2000, 11/21/2002, 03/04/2003   • Influenza Quad Vaccine (Inpatient) 10/05/2016, 10/09/2017   • MMR 08/02/2000   • Td 03/04/2003   • Tdap 03/01/2015       Review of Systems   Constitutional: Negative for activity change, appetite change, chills, fatigue and fever.   HENT: Negative for congestion, ear pain, rhinorrhea and sore throat.    Respiratory: Negative for cough and shortness of breath.    Cardiovascular: Negative for chest pain, palpitations and leg swelling.   Gastrointestinal: Negative for abdominal pain, constipation, diarrhea, nausea and vomiting.   Musculoskeletal: Negative for arthralgias and myalgias.   Neurological: Negative for headache.        Objective     /81 (BP Location: Left arm, Patient Position: Sitting)   Pulse 81   Ht 175.3 cm  "(69.02\")   Wt (!) 149 kg (327 lb 6.4 oz)   BMI 48.33 kg/m²       Physical Exam  Vitals and nursing note reviewed.   Constitutional:       General: He is not in acute distress.     Appearance: Normal appearance.   Cardiovascular:      Rate and Rhythm: Normal rate and regular rhythm.      Heart sounds: Normal heart sounds. No murmur heard.  Pulmonary:      Effort: Pulmonary effort is normal.      Breath sounds: Normal breath sounds.   Abdominal:      Palpations: Abdomen is soft.      Tenderness: There is no abdominal tenderness.   Musculoskeletal:      Cervical back: Neck supple.      Right lower leg: No edema.      Left lower leg: No edema.   Lymphadenopathy:      Cervical: No cervical adenopathy.   Neurological:      General: No focal deficit present.      Mental Status: He is alert.      Cranial Nerves: No cranial nerve deficit.      Coordination: Coordination normal.      Gait: Gait normal.   Psychiatric:         Mood and Affect: Mood normal.         Behavior: Behavior normal.         Result Review :                             Assessment and Plan      Diagnoses and all orders for this visit:    1. Essential hypertension (Primary)  Assessment & Plan:  Hypertension is improving with treatment.  Continue current treatment regimen.  Dietary sodium restriction.  Weight loss.  Regular aerobic exercise.  Continue current medications.  Blood pressure will be reassessed at the next regular appointment.    Orders:  -     CBC (No Diff)  -     TSH  -     lisinopril (PRINIVIL,ZESTRIL) 10 MG tablet; Take 1 tablet by mouth Daily.  Dispense: 90 tablet; Refill: 3    2. Obstructive sleep apnea (declines CPAP)   Assessment & Plan:  IMPROVED WITH CURRENT TREATMENT, CONTINUE SAME, WILL REEVALUATE AT NEXT VISIT       3. Nonalcoholic steatohepatitis (NELSON)  Assessment & Plan:  PLANS PER GASTRO (NOTES REVIEWED)     Orders:  -     Comprehensive Metabolic Panel  -     Hemoglobin A1c    4. High cholesterol  Assessment & Plan:  Lipid " abnormalities are unchanged.  Nutritional counseling was provided.  Lipids will be reassessed in 6 months   WILL CHECK OUR OWN SET OF LABS.    Orders:  -     Hemoglobin A1c  -     Lipid Panel  -     atorvastatin (LIPITOR) 40 MG tablet; Take 1 tablet by mouth Daily.  Dispense: 90 tablet; Refill: 3    5. RLQ abdominal pain  Comments:  OBSERVE AS IMPROVED           Follow Up     Return in about 6 months (around 11/5/2022).    Patient was given instructions and counseling regarding his condition or for health maintenance advice. Please see specific information pulled into the AVS if appropriate.

## 2022-05-05 NOTE — ASSESSMENT & PLAN NOTE
Lipid abnormalities are unchanged.  Nutritional counseling was provided.  Lipids will be reassessed in 6 months   WILL CHECK OUR OWN SET OF LABS.

## 2022-11-04 ENCOUNTER — OFFICE VISIT (OUTPATIENT)
Dept: FAMILY MEDICINE CLINIC | Age: 32
End: 2022-11-04

## 2022-11-04 VITALS
WEIGHT: 310.8 LBS | SYSTOLIC BLOOD PRESSURE: 133 MMHG | DIASTOLIC BLOOD PRESSURE: 70 MMHG | HEIGHT: 69 IN | BODY MASS INDEX: 46.03 KG/M2 | HEART RATE: 67 BPM

## 2022-11-04 DIAGNOSIS — H91.93 HEARING DIFFICULTY, BILATERAL: ICD-10-CM

## 2022-11-04 DIAGNOSIS — Z00.00 ANNUAL PHYSICAL EXAM: Primary | ICD-10-CM

## 2022-11-04 PROCEDURE — 99395 PREV VISIT EST AGE 18-39: CPT | Performed by: FAMILY MEDICINE

## 2022-11-04 NOTE — PROGRESS NOTES
Chief Complaint  Hypertension (6 month follow up/)    Subjective          Barney Richmond presents to [facility]  History of Present Illness  --ANNUAL EXAM, LAST EXAM ONE YEAR AGO, DOES NOT SMOKE  --LABS ERE OK, BP IS OK, TOLERATING CPAP WELL  --FOLLOWED BY GASTROENTEROLOGY FOR NELSON  --BILATERAL HEARING DIFFICULTY         No Known Allergies     Health Maintenance Due   Topic Date Due   • HEPATITIS C SCREENING  Never done   • COVID-19 Vaccine (4 - Booster for Moderna series) 12/18/2021   • INFLUENZA VACCINE  08/01/2022   • ANNUAL PHYSICAL  11/02/2022        Current Outpatient Medications on File Prior to Visit   Medication Sig   • atorvastatin (LIPITOR) 40 MG tablet Take 1 tablet by mouth Daily.   • lisinopril (PRINIVIL,ZESTRIL) 10 MG tablet Take 1 tablet by mouth Daily.     No current facility-administered medications on file prior to visit.       Immunization History   Administered Date(s) Administered   • COVID-19 (MODERNA) 1st, 2nd, 3rd Dose Only 03/17/2021, 04/14/2021, 10/23/2021   • Flu Vaccine Quad PF >36MO 10/23/2021   • Fluzone Quad >6mos (Multi-dose) 10/23/2021   • Hep B, Adolescent or Pediatric 08/02/2000, 11/21/2002, 03/04/2003   • Influenza Quad Vaccine (Inpatient) 10/05/2016, 10/09/2017   • MMR 08/02/2000   • Td 03/04/2003   • Tdap 03/01/2015       Review of Systems   Constitutional: Negative for activity change, appetite change, chills, fatigue and fever.   HENT: Negative for congestion, ear pain, rhinorrhea and sore throat.    Eyes: Negative for blurred vision and discharge.   Respiratory: Negative for cough and shortness of breath.    Cardiovascular: Negative for chest pain, palpitations and leg swelling.   Gastrointestinal: Negative for abdominal pain, constipation, diarrhea, nausea and vomiting.   Genitourinary: Negative for dysuria and hematuria.   Musculoskeletal: Negative for arthralgias and myalgias.   Neurological: Negative for headache.  "  Psychiatric/Behavioral: Negative for depressed mood.        Objective     /70 (BP Location: Left arm, Patient Position: Sitting)   Pulse 67   Ht 175.3 cm (69.02\")   Wt (!) 141 kg (310 lb 12.8 oz)   BMI 45.87 kg/m²       Physical Exam  Vitals and nursing note reviewed.   Constitutional:       General: He is not in acute distress.     Appearance: Normal appearance.   HENT:      Right Ear: Tympanic membrane normal.      Left Ear: Tympanic membrane normal.      Mouth/Throat:      Pharynx: Oropharynx is clear.   Eyes:      Conjunctiva/sclera: Conjunctivae normal.   Cardiovascular:      Rate and Rhythm: Normal rate and regular rhythm.      Heart sounds: Normal heart sounds. No murmur heard.  Pulmonary:      Effort: Pulmonary effort is normal.      Breath sounds: Normal breath sounds.   Abdominal:      General: Bowel sounds are normal.      Palpations: Abdomen is soft.      Tenderness: There is no abdominal tenderness.   Musculoskeletal:      Cervical back: Neck supple.      Right lower leg: No edema.      Left lower leg: No edema.   Lymphadenopathy:      Cervical: No cervical adenopathy.   Neurological:      General: No focal deficit present.      Mental Status: He is alert.      Cranial Nerves: No cranial nerve deficit.      Coordination: Coordination normal.      Gait: Gait normal.   Psychiatric:         Mood and Affect: Mood normal.         Behavior: Behavior normal.         Result Review :                             Assessment and Plan      Diagnoses and all orders for this visit:    1. Annual physical exam (Primary)  Assessment & Plan:  ADVICE GIVEN RE:  SEATBELT USE, ALCOHOL USE, HEALTHY DIET, ROUTINE EYE AND DENTAL EXAM, ROUTINE VACCINATIONS.        2. Hearing difficulty, bilateral  -     Ambulatory Referral to ENT (Otolaryngology)          Follow Up     Return in about 6 months (around 5/4/2023).    Patient was given instructions and counseling regarding his condition or for health maintenance advice. " Please see specific information pulled into the AVS if appropriate.       Answers for HPI/ROS submitted by the patient on 11/2/2022  What is the primary reason for your visit?: Physical

## 2023-04-30 DIAGNOSIS — E78.00 HIGH CHOLESTEROL: ICD-10-CM

## 2023-05-01 RX ORDER — ATORVASTATIN CALCIUM 40 MG/1
TABLET, FILM COATED ORAL
Qty: 90 TABLET | Refills: 3 | Status: SHIPPED | OUTPATIENT
Start: 2023-05-01

## 2023-05-05 ENCOUNTER — OFFICE VISIT (OUTPATIENT)
Dept: FAMILY MEDICINE CLINIC | Age: 33
End: 2023-05-05
Payer: COMMERCIAL

## 2023-05-05 ENCOUNTER — LAB (OUTPATIENT)
Dept: LAB | Facility: HOSPITAL | Age: 33
End: 2023-05-05
Payer: COMMERCIAL

## 2023-05-05 VITALS
DIASTOLIC BLOOD PRESSURE: 85 MMHG | BODY MASS INDEX: 46.65 KG/M2 | OXYGEN SATURATION: 98 % | SYSTOLIC BLOOD PRESSURE: 144 MMHG | HEIGHT: 69 IN | WEIGHT: 315 LBS | HEART RATE: 62 BPM

## 2023-05-05 DIAGNOSIS — G47.33 OBSTRUCTIVE SLEEP APNEA: ICD-10-CM

## 2023-05-05 DIAGNOSIS — H91.93 HEARING DIFFICULTY, BILATERAL: ICD-10-CM

## 2023-05-05 DIAGNOSIS — E78.00 HIGH CHOLESTEROL: ICD-10-CM

## 2023-05-05 DIAGNOSIS — I10 ESSENTIAL HYPERTENSION: Primary | ICD-10-CM

## 2023-05-05 DIAGNOSIS — K75.81 NONALCOHOLIC STEATOHEPATITIS (NASH): ICD-10-CM

## 2023-05-05 PROBLEM — Z00.00 ANNUAL PHYSICAL EXAM: Status: RESOLVED | Noted: 2021-11-01 | Resolved: 2023-05-05

## 2023-05-05 LAB
ALBUMIN SERPL-MCNC: 5 G/DL (ref 3.5–5.2)
ALBUMIN/GLOB SERPL: 2 G/DL
ALP SERPL-CCNC: 76 U/L (ref 39–117)
ALT SERPL W P-5'-P-CCNC: 42 U/L (ref 1–41)
ANION GAP SERPL CALCULATED.3IONS-SCNC: 11.1 MMOL/L (ref 5–15)
AST SERPL-CCNC: 25 U/L (ref 1–40)
BILIRUB SERPL-MCNC: 0.5 MG/DL (ref 0–1.2)
BUN SERPL-MCNC: 14 MG/DL (ref 6–20)
BUN/CREAT SERPL: 16.7 (ref 7–25)
CALCIUM SPEC-SCNC: 10.2 MG/DL (ref 8.6–10.5)
CHLORIDE SERPL-SCNC: 105 MMOL/L (ref 98–107)
CHOLEST SERPL-MCNC: 151 MG/DL (ref 0–200)
CO2 SERPL-SCNC: 24.9 MMOL/L (ref 22–29)
CREAT SERPL-MCNC: 0.84 MG/DL (ref 0.76–1.27)
DEPRECATED RDW RBC AUTO: 40.6 FL (ref 37–54)
EGFRCR SERPLBLD CKD-EPI 2021: 119.6 ML/MIN/1.73
ERYTHROCYTE [DISTWIDTH] IN BLOOD BY AUTOMATED COUNT: 13.2 % (ref 12.3–15.4)
GLOBULIN UR ELPH-MCNC: 2.5 GM/DL
GLUCOSE SERPL-MCNC: 108 MG/DL (ref 65–99)
HCT VFR BLD AUTO: 43.6 % (ref 37.5–51)
HDLC SERPL-MCNC: 25 MG/DL (ref 40–60)
HGB BLD-MCNC: 15.1 G/DL (ref 13–17.7)
LDLC SERPL CALC-MCNC: 110 MG/DL (ref 0–100)
LDLC/HDLC SERPL: 4.36 {RATIO}
MCH RBC QN AUTO: 29 PG (ref 26.6–33)
MCHC RBC AUTO-ENTMCNC: 34.6 G/DL (ref 31.5–35.7)
MCV RBC AUTO: 83.8 FL (ref 79–97)
PLATELET # BLD AUTO: 161 10*3/MM3 (ref 140–450)
PMV BLD AUTO: 11.2 FL (ref 6–12)
POTASSIUM SERPL-SCNC: 4.8 MMOL/L (ref 3.5–5.2)
PROT SERPL-MCNC: 7.5 G/DL (ref 6–8.5)
RBC # BLD AUTO: 5.2 10*6/MM3 (ref 4.14–5.8)
SODIUM SERPL-SCNC: 141 MMOL/L (ref 136–145)
TRIGL SERPL-MCNC: 85 MG/DL (ref 0–150)
TSH SERPL DL<=0.05 MIU/L-ACNC: 2.03 UIU/ML (ref 0.27–4.2)
VLDLC SERPL-MCNC: 16 MG/DL (ref 5–40)
WBC NRBC COR # BLD: 4.2 10*3/MM3 (ref 3.4–10.8)

## 2023-05-05 PROCEDURE — 84443 ASSAY THYROID STIM HORMONE: CPT | Performed by: FAMILY MEDICINE

## 2023-05-05 PROCEDURE — 99214 OFFICE O/P EST MOD 30 MIN: CPT | Performed by: FAMILY MEDICINE

## 2023-05-05 PROCEDURE — 85027 COMPLETE CBC AUTOMATED: CPT | Performed by: FAMILY MEDICINE

## 2023-05-05 PROCEDURE — 80053 COMPREHEN METABOLIC PANEL: CPT | Performed by: FAMILY MEDICINE

## 2023-05-05 PROCEDURE — 80061 LIPID PANEL: CPT | Performed by: FAMILY MEDICINE

## 2023-05-05 PROCEDURE — 36415 COLL VENOUS BLD VENIPUNCTURE: CPT | Performed by: FAMILY MEDICINE

## 2023-05-05 NOTE — PROGRESS NOTES
Chief Complaint  Hypertension    Subjective          Barney Richmond presents to [facility]  History of Present Illness  --TOLERATING BLOOD PRESSURE MEDICATION WITHOUT APPARENT SIDE EFFECTS  --LAST LIPIDS WERE OK, NO ISSUES WITH MEDS, STILL FOLLOWED BY GASTROENTEROLOGY FOR NELSON BUT IS NOT AWARE OF ANY RECENT LAB RESULTS  --TOLERATING CPAP WELL WITH LESS DAYTIME SLEEPINESS  --SAW ENT FOR HEARING DIFFICULTIES, DOES NOT NEED HEARING AIDS AT THIS TIME BUT WILL SEE THEM AGAIN LATER IN THE YEAR.          No Known Allergies     Health Maintenance Due   Topic Date Due   • HEPATITIS C SCREENING  Never done   • COVID-19 Vaccine (4 - Booster for Moderna series) 12/18/2021   • LIPID PANEL  05/05/2023        Current Outpatient Medications on File Prior to Visit   Medication Sig   • atorvastatin (LIPITOR) 40 MG tablet TAKE 1 TABLET BY MOUTH EVERY DAY   • lisinopril (PRINIVIL,ZESTRIL) 10 MG tablet Take 1 tablet by mouth Daily.     No current facility-administered medications on file prior to visit.       Immunization History   Administered Date(s) Administered   • COVID-19 (MODERNA) 1st,2nd,3rd Dose Monovalent 03/17/2021, 04/14/2021, 10/23/2021   • Flu Vaccine Quad PF >36MO 10/23/2021   • Fluzone Quad >6mos (Multi-dose) 10/23/2021   • Hep B, Adolescent or Pediatric 08/02/2000, 11/21/2002, 03/04/2003   • Influenza Quad Vaccine (Inpatient) 10/05/2016, 10/09/2017   • MMR 08/02/2000   • Td 03/04/2003   • Tdap 03/01/2015       Review of Systems   Constitutional: Negative for activity change, appetite change, chills, fatigue and fever.   HENT: Negative for congestion, ear pain, rhinorrhea and sore throat.    Respiratory: Negative for cough and shortness of breath.    Cardiovascular: Negative for chest pain, palpitations and leg swelling.   Gastrointestinal: Negative for abdominal pain, constipation, diarrhea, nausea and vomiting.   Musculoskeletal: Negative for arthralgias and myalgias.  "  Neurological: Negative for headache.        Objective     /85 (BP Location: Left arm, Patient Position: Sitting)   Pulse 62   Ht 175.3 cm (69\")   Wt (!) 146 kg (322 lb 9.6 oz)   SpO2 98% Comment: on room air  BMI 47.64 kg/m²       Physical Exam  Vitals and nursing note reviewed.   Constitutional:       General: He is not in acute distress.     Appearance: Normal appearance.   Cardiovascular:      Rate and Rhythm: Normal rate and regular rhythm.      Heart sounds: Normal heart sounds. No murmur heard.  Pulmonary:      Effort: Pulmonary effort is normal.      Breath sounds: Normal breath sounds.   Abdominal:      Palpations: Abdomen is soft.      Tenderness: There is no abdominal tenderness.   Musculoskeletal:      Cervical back: Neck supple.      Right lower leg: No edema.      Left lower leg: No edema.   Lymphadenopathy:      Cervical: No cervical adenopathy.   Neurological:      General: No focal deficit present.      Mental Status: He is alert.      Cranial Nerves: No cranial nerve deficit.      Coordination: Coordination normal.      Gait: Gait normal.   Psychiatric:         Mood and Affect: Mood normal.         Behavior: Behavior normal.         Result Review :                             Assessment and Plan      Diagnoses and all orders for this visit:    1. Essential hypertension (Primary)  Assessment & Plan:  Hypertension is improving with treatment.  Continue current treatment regimen.  Continue current medications.  Blood pressure will be reassessed at the next regular appointment.    Orders:  -     CBC (No Diff)  -     Comprehensive Metabolic Panel  -     TSH Rfx On Abnormal To Free T4    2. High cholesterol  Assessment & Plan:  Lipid abnormalities are improving with treatment.  Nutritional counseling was provided.  Lipids will be reassessed in 6 months.    Orders:  -     Lipid Panel    3. Nonalcoholic steatohepatitis (NELSON--followed by Gastroenterology)  Assessment & Plan:  PLANS PER " GASTROENTEROLOGY      4. Hearing difficulty, bilateral  Assessment & Plan:  F/U WITH ENT AS PLANNED       5. Obstructive sleep apnea (CPAP)   Assessment & Plan:  IMPROVED WITH CURRENT TREATMENT, CONTINUE SAME, WILL REEVALUATE AT NEXT VISIT             Follow Up     Return in about 6 months (around 11/5/2023).    Patient was given instructions and counseling regarding his condition or for health maintenance advice. Please see specific information pulled into the AVS if appropriate.

## 2023-08-07 DIAGNOSIS — I10 ESSENTIAL HYPERTENSION: ICD-10-CM

## 2023-08-07 RX ORDER — LISINOPRIL 10 MG/1
TABLET ORAL
Qty: 90 TABLET | Refills: 3 | Status: SHIPPED | OUTPATIENT
Start: 2023-08-07

## 2023-11-10 ENCOUNTER — OFFICE VISIT (OUTPATIENT)
Dept: FAMILY MEDICINE CLINIC | Age: 33
End: 2023-11-10
Payer: COMMERCIAL

## 2023-11-10 ENCOUNTER — LAB (OUTPATIENT)
Dept: LAB | Facility: HOSPITAL | Age: 33
End: 2023-11-10
Payer: COMMERCIAL

## 2023-11-10 VITALS
HEIGHT: 69 IN | SYSTOLIC BLOOD PRESSURE: 136 MMHG | HEART RATE: 81 BPM | BODY MASS INDEX: 46.65 KG/M2 | OXYGEN SATURATION: 96 % | WEIGHT: 315 LBS | DIASTOLIC BLOOD PRESSURE: 86 MMHG

## 2023-11-10 DIAGNOSIS — I10 ESSENTIAL HYPERTENSION: ICD-10-CM

## 2023-11-10 DIAGNOSIS — Z11.59 ENCOUNTER FOR SCREENING FOR OTHER VIRAL DISEASES: ICD-10-CM

## 2023-11-10 DIAGNOSIS — Z00.00 ANNUAL PHYSICAL EXAM: Primary | ICD-10-CM

## 2023-11-10 DIAGNOSIS — E78.00 HIGH CHOLESTEROL: ICD-10-CM

## 2023-11-10 DIAGNOSIS — K75.81 NONALCOHOLIC STEATOHEPATITIS (NASH): ICD-10-CM

## 2023-11-10 LAB
ALBUMIN SERPL-MCNC: 4.6 G/DL (ref 3.5–5.2)
ALBUMIN/GLOB SERPL: 2.4 G/DL
ALP SERPL-CCNC: 61 U/L (ref 39–117)
ALT SERPL W P-5'-P-CCNC: 58 U/L (ref 1–41)
ANION GAP SERPL CALCULATED.3IONS-SCNC: 7 MMOL/L (ref 5–15)
AST SERPL-CCNC: 28 U/L (ref 1–40)
BILIRUB SERPL-MCNC: 0.5 MG/DL (ref 0–1.2)
BUN SERPL-MCNC: 12 MG/DL (ref 6–20)
BUN/CREAT SERPL: 17.1 (ref 7–25)
CALCIUM SPEC-SCNC: 9.5 MG/DL (ref 8.6–10.5)
CHLORIDE SERPL-SCNC: 109 MMOL/L (ref 98–107)
CHOLEST SERPL-MCNC: 162 MG/DL (ref 0–200)
CO2 SERPL-SCNC: 26 MMOL/L (ref 22–29)
CREAT SERPL-MCNC: 0.7 MG/DL (ref 0.76–1.27)
DEPRECATED RDW RBC AUTO: 39.6 FL (ref 37–54)
EGFRCR SERPLBLD CKD-EPI 2021: 125.6 ML/MIN/1.73
ERYTHROCYTE [DISTWIDTH] IN BLOOD BY AUTOMATED COUNT: 13 % (ref 12.3–15.4)
GLOBULIN UR ELPH-MCNC: 1.9 GM/DL
GLUCOSE SERPL-MCNC: 108 MG/DL (ref 65–99)
HCT VFR BLD AUTO: 42.2 % (ref 37.5–51)
HCV AB SER DONR QL: NORMAL
HDLC SERPL-MCNC: 36 MG/DL (ref 40–60)
HGB BLD-MCNC: 14.9 G/DL (ref 13–17.7)
LDLC SERPL CALC-MCNC: 102 MG/DL (ref 0–100)
LDLC/HDLC SERPL: 2.76 {RATIO}
MCH RBC QN AUTO: 29.3 PG (ref 26.6–33)
MCHC RBC AUTO-ENTMCNC: 35.3 G/DL (ref 31.5–35.7)
MCV RBC AUTO: 82.9 FL (ref 79–97)
PLATELET # BLD AUTO: 158 10*3/MM3 (ref 140–450)
PMV BLD AUTO: 11 FL (ref 6–12)
POTASSIUM SERPL-SCNC: 4.6 MMOL/L (ref 3.5–5.2)
PROT SERPL-MCNC: 6.5 G/DL (ref 6–8.5)
RBC # BLD AUTO: 5.09 10*6/MM3 (ref 4.14–5.8)
SODIUM SERPL-SCNC: 142 MMOL/L (ref 136–145)
TRIGL SERPL-MCNC: 133 MG/DL (ref 0–150)
TSH SERPL DL<=0.05 MIU/L-ACNC: 1.29 UIU/ML (ref 0.27–4.2)
VLDLC SERPL-MCNC: 24 MG/DL (ref 5–40)
WBC NRBC COR # BLD: 4.91 10*3/MM3 (ref 3.4–10.8)

## 2023-11-10 PROCEDURE — 84443 ASSAY THYROID STIM HORMONE: CPT | Performed by: FAMILY MEDICINE

## 2023-11-10 PROCEDURE — 86803 HEPATITIS C AB TEST: CPT | Performed by: FAMILY MEDICINE

## 2023-11-10 PROCEDURE — 80061 LIPID PANEL: CPT | Performed by: FAMILY MEDICINE

## 2023-11-10 PROCEDURE — 85027 COMPLETE CBC AUTOMATED: CPT | Performed by: FAMILY MEDICINE

## 2023-11-10 PROCEDURE — 36415 COLL VENOUS BLD VENIPUNCTURE: CPT | Performed by: FAMILY MEDICINE

## 2023-11-10 PROCEDURE — 80053 COMPREHEN METABOLIC PANEL: CPT | Performed by: FAMILY MEDICINE

## 2023-11-10 RX ORDER — SEMAGLUTIDE 0.25 MG/.5ML
INJECTION, SOLUTION SUBCUTANEOUS
COMMUNITY
Start: 2023-08-03 | End: 2023-11-10

## 2023-11-10 NOTE — PROGRESS NOTES
Chief Complaint  Hypertension (6 month )    Subjective          Barney Richmond presents to [facility]  History of Present Illness  --ANNUAL EXAM, LAST EXAM . ONE YEAR AGO, DOES NOT SMOKE  --LAST LIPIDS WERE OK, NO ISSUES WITH THE STATIN  --TOLERATING BLOOD PRESSURE MEDICATION WITHOUT APPARENT SIDE EFFECTS  --FOLLOWED BY GASTROENTEROLOGY FOR NELSON, HAVING SERIAL U/S'S.  IS NO LINGER IN THE STUDY        No Known Allergies     Health Maintenance Due   Topic Date Due    HEPATITIS C SCREENING  Never done    INFLUENZA VACCINE  08/01/2023    COVID-19 Vaccine (4 - 2023-24 season) 09/01/2023    ANNUAL PHYSICAL  11/04/2023        Current Outpatient Medications on File Prior to Visit   Medication Sig    atorvastatin (LIPITOR) 40 MG tablet TAKE 1 TABLET BY MOUTH EVERY DAY    lisinopril (PRINIVIL,ZESTRIL) 10 MG tablet TAKE 1 TABLET BY MOUTH EVERY DAY    [DISCONTINUED] Wegovy 0.25 MG/0.5ML solution auto-injector      No current facility-administered medications on file prior to visit.       Immunization History   Administered Date(s) Administered    COVID-19 (MODERNA) 1st,2nd,3rd Dose Monovalent 03/17/2021, 04/14/2021, 10/23/2021    Flu Vaccine Quad PF >36MO 10/23/2021    Fluzone (or Fluarix & Flulaval for VFC) >6mos 10/23/2021    Fluzone Quad >6mos (Multi-dose) 10/23/2021    Hep B, Adolescent or Pediatric 08/02/2000, 11/21/2002, 03/04/2003    Influenza Quad Vaccine (Inpatient) 10/05/2016, 10/09/2017    MMR 08/02/2000    Td (TDVAX) 03/04/2003    Tdap 03/01/2015       Review of Systems   Constitutional:  Negative for activity change, appetite change, chills, fatigue and fever.   HENT:  Negative for congestion, ear pain, rhinorrhea and sore throat.    Eyes:  Negative for blurred vision and discharge.   Respiratory:  Negative for cough and shortness of breath.    Cardiovascular:  Negative for chest pain, palpitations and leg swelling.   Gastrointestinal:  Negative for abdominal pain,  "constipation, diarrhea, nausea and vomiting.   Genitourinary:  Negative for dysuria and hematuria.   Musculoskeletal:  Negative for arthralgias and myalgias.   Neurological:  Negative for headache.   Psychiatric/Behavioral:  Negative for depressed mood.         Objective     /86   Pulse 81   Ht 175.3 cm (69.02\")   Wt (!) 144 kg (317 lb)   SpO2 96% Comment: room air  BMI 46.79 kg/m²       Physical Exam  Vitals and nursing note reviewed.   Constitutional:       General: He is not in acute distress.     Appearance: Normal appearance.   HENT:      Right Ear: Tympanic membrane normal.      Left Ear: Tympanic membrane normal.      Mouth/Throat:      Pharynx: Oropharynx is clear.   Eyes:      Conjunctiva/sclera: Conjunctivae normal.   Cardiovascular:      Rate and Rhythm: Normal rate and regular rhythm.      Heart sounds: Normal heart sounds. No murmur heard.  Pulmonary:      Effort: Pulmonary effort is normal.      Breath sounds: Normal breath sounds.   Abdominal:      Palpations: Abdomen is soft.      Tenderness: There is no abdominal tenderness.   Musculoskeletal:      Cervical back: Neck supple.      Right lower leg: No edema.      Left lower leg: No edema.   Lymphadenopathy:      Cervical: No cervical adenopathy.   Neurological:      General: No focal deficit present.      Mental Status: He is alert.      Cranial Nerves: No cranial nerve deficit.      Coordination: Coordination normal.      Gait: Gait normal.   Psychiatric:         Mood and Affect: Mood normal.         Behavior: Behavior normal.         Result Review :                             Assessment and Plan      Diagnoses and all orders for this visit:    1. Annual physical exam (Primary)  Assessment & Plan:  ADVICE GIVEN RE:  SEATBELT USE, ALCOHOL USE, HEALTHY DIET, ROUTINE EYE AND DENTAL EXAM, ROUTINE VACCINATIONS.    DECLINES A FLU SHOT      2. Essential hypertension  Assessment & Plan:  Hypertension is improving with treatment.  Continue " current treatment regimen.  Blood pressure will be reassessed at the next regular appointment.    Orders:  -     CBC (No Diff)  -     TSH Rfx On Abnormal To Free T4    3. High cholesterol  Assessment & Plan:  Lipid abnormalities are improving with treatment.  Nutritional counseling was provided.  Lipids will be reassessed in 6 months.    Orders:  -     Comprehensive Metabolic Panel  -     Lipid Panel    4. Encounter for screening for other viral diseases  -     Hepatitis C Antibody    5. Nonalcoholic steatohepatitis (NELSON--followed by Gastroenterology)  Assessment & Plan:  PLANS PER GASTROENTEROLOGY               Follow Up     Return in about 6 months (around 5/10/2024).    Patient was given instructions and counseling regarding his condition or for health maintenance advice. Please see specific information pulled into the AVS if appropriate.

## 2023-11-10 NOTE — ASSESSMENT & PLAN NOTE
ADVICE GIVEN RE:  SEATBELT USE, ALCOHOL USE, HEALTHY DIET, ROUTINE EYE AND DENTAL EXAM, ROUTINE VACCINATIONS.    DECLINES A FLU SHOT

## 2024-05-07 ENCOUNTER — OFFICE VISIT (OUTPATIENT)
Dept: FAMILY MEDICINE CLINIC | Age: 34
End: 2024-05-07
Payer: COMMERCIAL

## 2024-05-07 ENCOUNTER — LAB (OUTPATIENT)
Dept: LAB | Facility: HOSPITAL | Age: 34
End: 2024-05-07
Payer: COMMERCIAL

## 2024-05-07 VITALS
HEIGHT: 69 IN | WEIGHT: 292.8 LBS | HEART RATE: 70 BPM | BODY MASS INDEX: 43.37 KG/M2 | DIASTOLIC BLOOD PRESSURE: 90 MMHG | SYSTOLIC BLOOD PRESSURE: 150 MMHG

## 2024-05-07 DIAGNOSIS — E61.1 LOW SERUM IRON: ICD-10-CM

## 2024-05-07 DIAGNOSIS — E78.00 HIGH CHOLESTEROL: Primary | ICD-10-CM

## 2024-05-07 DIAGNOSIS — R53.83 OTHER FATIGUE: ICD-10-CM

## 2024-05-07 DIAGNOSIS — Z86.79 HISTORY OF HYPERTENSION: ICD-10-CM

## 2024-05-07 PROBLEM — Z00.00 ANNUAL PHYSICAL EXAM: Status: RESOLVED | Noted: 2021-11-01 | Resolved: 2024-05-07

## 2024-05-07 LAB
ALBUMIN SERPL-MCNC: 4.8 G/DL (ref 3.5–5.2)
ALBUMIN/GLOB SERPL: 2.3 G/DL
ALP SERPL-CCNC: 90 U/L (ref 39–117)
ALT SERPL W P-5'-P-CCNC: 53 U/L (ref 1–41)
ANION GAP SERPL CALCULATED.3IONS-SCNC: 10.3 MMOL/L (ref 5–15)
AST SERPL-CCNC: 20 U/L (ref 1–40)
BILIRUB SERPL-MCNC: 0.6 MG/DL (ref 0–1.2)
BUN SERPL-MCNC: 23 MG/DL (ref 6–20)
BUN/CREAT SERPL: 27.1 (ref 7–25)
CALCIUM SPEC-SCNC: 9.8 MG/DL (ref 8.6–10.5)
CHLORIDE SERPL-SCNC: 105 MMOL/L (ref 98–107)
CHOLEST SERPL-MCNC: 234 MG/DL (ref 0–200)
CO2 SERPL-SCNC: 23.7 MMOL/L (ref 22–29)
CREAT SERPL-MCNC: 0.85 MG/DL (ref 0.76–1.27)
DEPRECATED RDW RBC AUTO: 39.6 FL (ref 37–54)
EGFRCR SERPLBLD CKD-EPI 2021: 118.4 ML/MIN/1.73
ERYTHROCYTE [DISTWIDTH] IN BLOOD BY AUTOMATED COUNT: 12.9 % (ref 12.3–15.4)
GLOBULIN UR ELPH-MCNC: 2.1 GM/DL
GLUCOSE SERPL-MCNC: 92 MG/DL (ref 65–99)
HCT VFR BLD AUTO: 47.2 % (ref 37.5–51)
HDLC SERPL-MCNC: 26 MG/DL (ref 40–60)
HGB BLD-MCNC: 16 G/DL (ref 13–17.7)
IRON 24H UR-MRATE: 66 MCG/DL (ref 59–158)
IRON SATN MFR SERPL: 18 % (ref 20–50)
LDLC SERPL CALC-MCNC: 160 MG/DL (ref 0–100)
LDLC/HDLC SERPL: 6.05 {RATIO}
MCH RBC QN AUTO: 28.8 PG (ref 26.6–33)
MCHC RBC AUTO-ENTMCNC: 33.9 G/DL (ref 31.5–35.7)
MCV RBC AUTO: 85 FL (ref 79–97)
PLATELET # BLD AUTO: 173 10*3/MM3 (ref 140–450)
PMV BLD AUTO: 12.4 FL (ref 6–12)
POTASSIUM SERPL-SCNC: 4.5 MMOL/L (ref 3.5–5.2)
PROT SERPL-MCNC: 6.9 G/DL (ref 6–8.5)
RBC # BLD AUTO: 5.55 10*6/MM3 (ref 4.14–5.8)
SODIUM SERPL-SCNC: 139 MMOL/L (ref 136–145)
TESTOST SERPL-MCNC: 308 NG/DL (ref 249–836)
TIBC SERPL-MCNC: 373 MCG/DL (ref 298–536)
TRANSFERRIN SERPL-MCNC: 250 MG/DL (ref 200–360)
TRIGL SERPL-MCNC: 254 MG/DL (ref 0–150)
TSH SERPL DL<=0.05 MIU/L-ACNC: 2.27 UIU/ML (ref 0.27–4.2)
VLDLC SERPL-MCNC: 48 MG/DL (ref 5–40)
WBC NRBC COR # BLD AUTO: 4.47 10*3/MM3 (ref 3.4–10.8)

## 2024-05-07 PROCEDURE — 83540 ASSAY OF IRON: CPT | Performed by: FAMILY MEDICINE

## 2024-05-07 PROCEDURE — 80050 GENERAL HEALTH PANEL: CPT | Performed by: FAMILY MEDICINE

## 2024-05-07 PROCEDURE — 84466 ASSAY OF TRANSFERRIN: CPT | Performed by: FAMILY MEDICINE

## 2024-05-07 PROCEDURE — 99214 OFFICE O/P EST MOD 30 MIN: CPT | Performed by: FAMILY MEDICINE

## 2024-05-07 PROCEDURE — 84403 ASSAY OF TOTAL TESTOSTERONE: CPT | Performed by: FAMILY MEDICINE

## 2024-05-07 PROCEDURE — 80061 LIPID PANEL: CPT | Performed by: FAMILY MEDICINE

## 2024-05-07 PROCEDURE — 36415 COLL VENOUS BLD VENIPUNCTURE: CPT | Performed by: FAMILY MEDICINE

## 2024-05-11 ENCOUNTER — DOCUMENTATION (OUTPATIENT)
Dept: FAMILY MEDICINE CLINIC | Age: 34
End: 2024-05-11
Payer: COMMERCIAL

## 2024-05-11 RX ORDER — ATORVASTATIN CALCIUM 20 MG/1
20 TABLET, FILM COATED ORAL NIGHTLY
Qty: 90 TABLET | Refills: 1 | Status: SHIPPED | OUTPATIENT
Start: 2024-05-11 | End: 2024-11-07

## 2024-05-13 DIAGNOSIS — E78.00 HIGH CHOLESTEROL: Primary | ICD-10-CM

## 2024-05-28 ENCOUNTER — TELEPHONE (OUTPATIENT)
Dept: FAMILY MEDICINE CLINIC | Age: 34
End: 2024-05-28
Payer: COMMERCIAL

## 2024-09-12 ENCOUNTER — TELEPHONE (OUTPATIENT)
Dept: FAMILY MEDICINE CLINIC | Age: 34
End: 2024-09-12
Payer: COMMERCIAL